# Patient Record
Sex: FEMALE | Race: BLACK OR AFRICAN AMERICAN | URBAN - METROPOLITAN AREA
[De-identification: names, ages, dates, MRNs, and addresses within clinical notes are randomized per-mention and may not be internally consistent; named-entity substitution may affect disease eponyms.]

---

## 2017-12-18 ENCOUNTER — HOSPITAL ENCOUNTER (OUTPATIENT)
Dept: OTHER | Age: 77
Discharge: HOME | End: 2017-12-18
Attending: FAMILY MEDICINE | Admitting: FAMILY MEDICINE

## 2017-12-18 DIAGNOSIS — M25.512 CHRONIC PAIN OF BOTH SHOULDERS: ICD-10-CM

## 2017-12-18 DIAGNOSIS — G89.29 CHRONIC PAIN OF BOTH SHOULDERS: ICD-10-CM

## 2017-12-18 DIAGNOSIS — M25.511 CHRONIC PAIN OF BOTH SHOULDERS: ICD-10-CM

## 2023-10-24 ENCOUNTER — APPOINTMENT (OUTPATIENT)
Dept: CT IMAGING | Age: 83
DRG: 305 | End: 2023-10-24
Attending: STUDENT IN AN ORGANIZED HEALTH CARE EDUCATION/TRAINING PROGRAM
Payer: MEDICARE

## 2023-10-24 ENCOUNTER — HOSPITAL ENCOUNTER (INPATIENT)
Age: 83
LOS: 2 days | Discharge: HOME HEALTH | DRG: 305 | End: 2023-10-26
Attending: STUDENT IN AN ORGANIZED HEALTH CARE EDUCATION/TRAINING PROGRAM | Admitting: INTERNAL MEDICINE
Payer: MEDICARE

## 2023-10-24 ENCOUNTER — APPOINTMENT (OUTPATIENT)
Dept: CT IMAGING | Age: 83
DRG: 305 | End: 2023-10-24
Payer: MEDICARE

## 2023-10-24 ENCOUNTER — APPOINTMENT (OUTPATIENT)
Dept: GENERAL RADIOLOGY | Age: 83
DRG: 305 | End: 2023-10-24
Payer: MEDICARE

## 2023-10-24 DIAGNOSIS — I16.1 HYPERTENSIVE EMERGENCY: ICD-10-CM

## 2023-10-24 DIAGNOSIS — E87.6 HYPOKALEMIA: ICD-10-CM

## 2023-10-24 DIAGNOSIS — Z86.79 PERSONAL HISTORY OF CEREBRAL ARTERY STENOSIS: ICD-10-CM

## 2023-10-24 DIAGNOSIS — I72.0 ANEURYSM OF CAROTID ARTERY (HCC): Primary | ICD-10-CM

## 2023-10-24 DIAGNOSIS — Z86.73 HISTORY OF CVA (CEREBROVASCULAR ACCIDENT): ICD-10-CM

## 2023-10-24 DIAGNOSIS — R94.31 ABNORMAL EKG: ICD-10-CM

## 2023-10-24 DIAGNOSIS — R29.898 WEAKNESS OF BOTH LOWER EXTREMITIES: ICD-10-CM

## 2023-10-24 LAB
ALBUMIN SERPL-MCNC: 4.6 G/DL (ref 3.4–5)
ALBUMIN/GLOB SERPL: 1.2 {RATIO} (ref 1.1–2.2)
ALP SERPL-CCNC: 110 U/L (ref 40–129)
ALT SERPL-CCNC: 42 U/L (ref 10–40)
ANION GAP SERPL CALCULATED.3IONS-SCNC: 19 MMOL/L (ref 3–16)
AST SERPL-CCNC: 39 U/L (ref 15–37)
BACTERIA URNS QL MICRO: ABNORMAL /HPF
BASOPHILS # BLD: 0 K/UL (ref 0–0.2)
BASOPHILS NFR BLD: 0.4 %
BILIRUB SERPL-MCNC: 0.6 MG/DL (ref 0–1)
BILIRUB UR QL STRIP.AUTO: NEGATIVE
BUN SERPL-MCNC: 27 MG/DL (ref 7–20)
CALCIUM SERPL-MCNC: 9.3 MG/DL (ref 8.3–10.6)
CHLORIDE SERPL-SCNC: 99 MMOL/L (ref 99–110)
CK SERPL-CCNC: 505 U/L (ref 26–192)
CLARITY UR: CLEAR
CO2 SERPL-SCNC: 18 MMOL/L (ref 21–32)
COLOR UR: YELLOW
CREAT SERPL-MCNC: 1 MG/DL (ref 0.6–1.2)
DEPRECATED RDW RBC AUTO: 14.5 % (ref 12.4–15.4)
EKG ATRIAL RATE: 95 BPM
EKG DIAGNOSIS: NORMAL
EKG P AXIS: 59 DEGREES
EKG P-R INTERVAL: 162 MS
EKG Q-T INTERVAL: 372 MS
EKG QRS DURATION: 86 MS
EKG QTC CALCULATION (BAZETT): 467 MS
EKG R AXIS: -14 DEGREES
EKG T AXIS: 154 DEGREES
EKG VENTRICULAR RATE: 95 BPM
EOSINOPHIL # BLD: 0 K/UL (ref 0–0.6)
EOSINOPHIL NFR BLD: 0.1 %
EPI CELLS #/AREA URNS AUTO: 0 /HPF (ref 0–5)
GFR SERPLBLD CREATININE-BSD FMLA CKD-EPI: 56 ML/MIN/{1.73_M2}
GLUCOSE SERPL-MCNC: 96 MG/DL (ref 70–99)
GLUCOSE UR STRIP.AUTO-MCNC: NEGATIVE MG/DL
HCT VFR BLD AUTO: 36.6 % (ref 36–48)
HGB BLD-MCNC: 12.1 G/DL (ref 12–16)
HGB UR QL STRIP.AUTO: NEGATIVE
HYALINE CASTS #/AREA URNS AUTO: 0 /LPF (ref 0–8)
INR PPP: 0.99 (ref 0.84–1.16)
KETONES UR STRIP.AUTO-MCNC: NEGATIVE MG/DL
LEUKOCYTE ESTERASE UR QL STRIP.AUTO: ABNORMAL
LYMPHOCYTES # BLD: 1 K/UL (ref 1–5.1)
LYMPHOCYTES NFR BLD: 9.7 %
MCH RBC QN AUTO: 30.2 PG (ref 26–34)
MCHC RBC AUTO-ENTMCNC: 33 G/DL (ref 31–36)
MCV RBC AUTO: 91.8 FL (ref 80–100)
MONOCYTES # BLD: 0.7 K/UL (ref 0–1.3)
MONOCYTES NFR BLD: 6.5 %
NEUTROPHILS # BLD: 8.6 K/UL (ref 1.7–7.7)
NEUTROPHILS NFR BLD: 83.3 %
NITRITE UR QL STRIP.AUTO: POSITIVE
PH UR STRIP.AUTO: 5.5 [PH] (ref 5–8)
PLATELET # BLD AUTO: 270 K/UL (ref 135–450)
PMV BLD AUTO: 7.8 FL (ref 5–10.5)
POTASSIUM SERPL-SCNC: 3.7 MMOL/L (ref 3.5–5.1)
PROT SERPL-MCNC: 8.5 G/DL (ref 6.4–8.2)
PROT UR STRIP.AUTO-MCNC: NEGATIVE MG/DL
PROTHROMBIN TIME: 13.1 SEC (ref 11.5–14.8)
RBC # BLD AUTO: 3.99 M/UL (ref 4–5.2)
RBC CLUMPS #/AREA URNS AUTO: 0 /HPF (ref 0–4)
SODIUM SERPL-SCNC: 136 MMOL/L (ref 136–145)
SP GR UR STRIP.AUTO: 1.01 (ref 1–1.03)
TROPONIN, HIGH SENSITIVITY: 15 NG/L (ref 0–14)
TROPONIN, HIGH SENSITIVITY: 27 NG/L (ref 0–14)
TROPONIN, HIGH SENSITIVITY: 29 NG/L (ref 0–14)
UA DIPSTICK W REFLEX MICRO PNL UR: YES
URN SPEC COLLECT METH UR: ABNORMAL
UROBILINOGEN UR STRIP-ACNC: 0.2 E.U./DL
WBC # BLD AUTO: 10.3 K/UL (ref 4–11)
WBC #/AREA URNS AUTO: 1 /HPF (ref 0–5)

## 2023-10-24 PROCEDURE — 92526 ORAL FUNCTION THERAPY: CPT

## 2023-10-24 PROCEDURE — 84484 ASSAY OF TROPONIN QUANT: CPT

## 2023-10-24 PROCEDURE — 72131 CT LUMBAR SPINE W/O DYE: CPT

## 2023-10-24 PROCEDURE — 2580000003 HC RX 258: Performed by: INTERNAL MEDICINE

## 2023-10-24 PROCEDURE — 6370000000 HC RX 637 (ALT 250 FOR IP): Performed by: INTERNAL MEDICINE

## 2023-10-24 PROCEDURE — 81001 URINALYSIS AUTO W/SCOPE: CPT

## 2023-10-24 PROCEDURE — APPNB30 APP NON BILLABLE TIME 0-30 MINS

## 2023-10-24 PROCEDURE — 6360000002 HC RX W HCPCS: Performed by: INTERNAL MEDICINE

## 2023-10-24 PROCEDURE — 85610 PROTHROMBIN TIME: CPT

## 2023-10-24 PROCEDURE — 1200000000 HC SEMI PRIVATE

## 2023-10-24 PROCEDURE — 36415 COLL VENOUS BLD VENIPUNCTURE: CPT

## 2023-10-24 PROCEDURE — 99285 EMERGENCY DEPT VISIT HI MDM: CPT

## 2023-10-24 PROCEDURE — 70498 CT ANGIOGRAPHY NECK: CPT

## 2023-10-24 PROCEDURE — 4A03X5D MEASUREMENT OF ARTERIAL FLOW, INTRACRANIAL, EXTERNAL APPROACH: ICD-10-PCS | Performed by: INTERNAL MEDICINE

## 2023-10-24 PROCEDURE — 93010 ELECTROCARDIOGRAM REPORT: CPT | Performed by: INTERNAL MEDICINE

## 2023-10-24 PROCEDURE — 2060000000 HC ICU INTERMEDIATE R&B

## 2023-10-24 PROCEDURE — 6370000000 HC RX 637 (ALT 250 FOR IP): Performed by: STUDENT IN AN ORGANIZED HEALTH CARE EDUCATION/TRAINING PROGRAM

## 2023-10-24 PROCEDURE — 92610 EVALUATE SWALLOWING FUNCTION: CPT

## 2023-10-24 PROCEDURE — 85025 COMPLETE CBC W/AUTO DIFF WBC: CPT

## 2023-10-24 PROCEDURE — 80053 COMPREHEN METABOLIC PANEL: CPT

## 2023-10-24 PROCEDURE — APPSS60 APP SPLIT SHARED TIME 46-60 MINUTES

## 2023-10-24 PROCEDURE — 70450 CT HEAD/BRAIN W/O DYE: CPT

## 2023-10-24 PROCEDURE — 93005 ELECTROCARDIOGRAM TRACING: CPT | Performed by: STUDENT IN AN ORGANIZED HEALTH CARE EDUCATION/TRAINING PROGRAM

## 2023-10-24 PROCEDURE — 71045 X-RAY EXAM CHEST 1 VIEW: CPT

## 2023-10-24 PROCEDURE — 82550 ASSAY OF CK (CPK): CPT

## 2023-10-24 PROCEDURE — 6360000004 HC RX CONTRAST MEDICATION: Performed by: STUDENT IN AN ORGANIZED HEALTH CARE EDUCATION/TRAINING PROGRAM

## 2023-10-24 RX ORDER — ONDANSETRON 4 MG/1
4 TABLET, ORALLY DISINTEGRATING ORAL EVERY 8 HOURS PRN
Status: DISCONTINUED | OUTPATIENT
Start: 2023-10-24 | End: 2023-10-26 | Stop reason: HOSPADM

## 2023-10-24 RX ORDER — NIFEDIPINE 60 MG/1
60 TABLET, EXTENDED RELEASE ORAL DAILY
Status: ON HOLD | COMMUNITY
Start: 2021-06-22 | End: 2023-10-26 | Stop reason: SDUPTHER

## 2023-10-24 RX ORDER — SODIUM CHLORIDE 0.9 % (FLUSH) 0.9 %
5-40 SYRINGE (ML) INJECTION PRN
Status: DISCONTINUED | OUTPATIENT
Start: 2023-10-24 | End: 2023-10-26 | Stop reason: HOSPADM

## 2023-10-24 RX ORDER — ROSUVASTATIN CALCIUM 40 MG/1
40 TABLET, COATED ORAL NIGHTLY
Status: DISCONTINUED | OUTPATIENT
Start: 2023-10-24 | End: 2023-10-26 | Stop reason: HOSPADM

## 2023-10-24 RX ORDER — SODIUM CHLORIDE 0.9 % (FLUSH) 0.9 %
5-40 SYRINGE (ML) INJECTION EVERY 12 HOURS SCHEDULED
Status: DISCONTINUED | OUTPATIENT
Start: 2023-10-24 | End: 2023-10-26 | Stop reason: HOSPADM

## 2023-10-24 RX ORDER — ASPIRIN 81 MG/1
81 TABLET, CHEWABLE ORAL DAILY
Status: DISCONTINUED | OUTPATIENT
Start: 2023-10-24 | End: 2023-10-26 | Stop reason: HOSPADM

## 2023-10-24 RX ORDER — SODIUM CHLORIDE 9 MG/ML
INJECTION, SOLUTION INTRAVENOUS PRN
Status: DISCONTINUED | OUTPATIENT
Start: 2023-10-24 | End: 2023-10-26 | Stop reason: HOSPADM

## 2023-10-24 RX ORDER — HYDRALAZINE HYDROCHLORIDE 25 MG/1
25 TABLET, FILM COATED ORAL ONCE
Status: COMPLETED | OUTPATIENT
Start: 2023-10-24 | End: 2023-10-24

## 2023-10-24 RX ORDER — ASPIRIN 81 MG/1
81 TABLET, CHEWABLE ORAL DAILY
COMMUNITY
Start: 2021-01-20

## 2023-10-24 RX ORDER — LOSARTAN POTASSIUM 50 MG/1
50 TABLET ORAL DAILY
COMMUNITY
Start: 2022-08-04

## 2023-10-24 RX ORDER — LOSARTAN POTASSIUM 25 MG/1
50 TABLET ORAL DAILY
Status: DISCONTINUED | OUTPATIENT
Start: 2023-10-25 | End: 2023-10-26 | Stop reason: HOSPADM

## 2023-10-24 RX ORDER — ASCORBIC ACID 500 MG
500 TABLET ORAL 2 TIMES DAILY
COMMUNITY

## 2023-10-24 RX ORDER — POLYETHYLENE GLYCOL 3350 17 G/17G
17 POWDER, FOR SOLUTION ORAL DAILY PRN
Status: DISCONTINUED | OUTPATIENT
Start: 2023-10-24 | End: 2023-10-26 | Stop reason: HOSPADM

## 2023-10-24 RX ORDER — ASPIRIN 300 MG/1
300 SUPPOSITORY RECTAL DAILY
Status: DISCONTINUED | OUTPATIENT
Start: 2023-10-24 | End: 2023-10-26 | Stop reason: HOSPADM

## 2023-10-24 RX ORDER — ONDANSETRON 2 MG/ML
4 INJECTION INTRAMUSCULAR; INTRAVENOUS EVERY 6 HOURS PRN
Status: DISCONTINUED | OUTPATIENT
Start: 2023-10-24 | End: 2023-10-26 | Stop reason: HOSPADM

## 2023-10-24 RX ORDER — NIFEDIPINE 30 MG/1
60 TABLET, EXTENDED RELEASE ORAL 2 TIMES DAILY
Status: DISCONTINUED | OUTPATIENT
Start: 2023-10-24 | End: 2023-10-26 | Stop reason: HOSPADM

## 2023-10-24 RX ORDER — ATORVASTATIN CALCIUM 40 MG/1
40 TABLET, FILM COATED ORAL DAILY
COMMUNITY
Start: 2023-09-11

## 2023-10-24 RX ORDER — ENOXAPARIN SODIUM 100 MG/ML
40 INJECTION SUBCUTANEOUS DAILY
Status: DISCONTINUED | OUTPATIENT
Start: 2023-10-25 | End: 2023-10-26 | Stop reason: HOSPADM

## 2023-10-24 RX ORDER — HYDRALAZINE HYDROCHLORIDE 20 MG/ML
10 INJECTION INTRAMUSCULAR; INTRAVENOUS EVERY 6 HOURS PRN
Status: DISCONTINUED | OUTPATIENT
Start: 2023-10-24 | End: 2023-10-25

## 2023-10-24 RX ORDER — NIFEDIPINE 30 MG/1
60 TABLET, EXTENDED RELEASE ORAL DAILY
Status: DISCONTINUED | OUTPATIENT
Start: 2023-10-24 | End: 2023-10-24

## 2023-10-24 RX ADMIN — ROSUVASTATIN CALCIUM 40 MG: 40 TABLET, COATED ORAL at 21:25

## 2023-10-24 RX ADMIN — ASPIRIN 81 MG: 81 TABLET, CHEWABLE ORAL at 16:34

## 2023-10-24 RX ADMIN — NIFEDIPINE 60 MG: 30 TABLET, EXTENDED RELEASE ORAL at 21:25

## 2023-10-24 RX ADMIN — METOPROLOL TARTRATE 25 MG: 25 TABLET, FILM COATED ORAL at 13:34

## 2023-10-24 RX ADMIN — HYDRALAZINE HYDROCHLORIDE 25 MG: 25 TABLET, FILM COATED ORAL at 09:03

## 2023-10-24 RX ADMIN — CEFTRIAXONE SODIUM 1000 MG: 1 INJECTION, POWDER, FOR SOLUTION INTRAMUSCULAR; INTRAVENOUS at 16:35

## 2023-10-24 RX ADMIN — IOPAMIDOL 75 ML: 755 INJECTION, SOLUTION INTRAVENOUS at 07:21

## 2023-10-24 RX ADMIN — METOPROLOL TARTRATE 25 MG: 25 TABLET, FILM COATED ORAL at 23:59

## 2023-10-24 RX ADMIN — SODIUM CHLORIDE, PRESERVATIVE FREE 10 ML: 5 INJECTION INTRAVENOUS at 21:14

## 2023-10-24 RX ADMIN — HYDRALAZINE HYDROCHLORIDE 10 MG: 20 INJECTION, SOLUTION INTRAMUSCULAR; INTRAVENOUS at 20:02

## 2023-10-24 RX ADMIN — NIFEDIPINE 60 MG: 30 TABLET, EXTENDED RELEASE ORAL at 13:35

## 2023-10-24 ASSESSMENT — LIFESTYLE VARIABLES
HOW MANY STANDARD DRINKS CONTAINING ALCOHOL DO YOU HAVE ON A TYPICAL DAY: 1 OR 2
HOW OFTEN DO YOU HAVE A DRINK CONTAINING ALCOHOL: MONTHLY OR LESS

## 2023-10-24 ASSESSMENT — PAIN SCALES - GENERAL
PAINLEVEL_OUTOF10: 0

## 2023-10-24 NOTE — PLAN OF CARE
Problem: Discharge Planning  Goal: Discharge to home or other facility with appropriate resources  Outcome: Progressing     Problem: Safety - Adult  Goal: Free from fall injury  Outcome: Progressing  Note: Bed alarm engaged, patient educated on how to turn call light on if she needs assistance, patient oriented to room     Problem: ABCDS Injury Assessment  Goal: Absence of physical injury  Outcome: Progressing

## 2023-10-24 NOTE — CONSULTS
In patient Neurology consult        [facility] Neurology      Eliud Lees MD      Alab Holcombhy  1940    Date of Service: 10/24/2023    Referring Physician: Francesco Branch MD      Reason for the consult and CC:  bilateral lower extremity weakness. HPI:   The patient is a 81y.o.  years old female with past medical history of hypertension, hyperlipidemia and neuropathy hospital with worsening weakness. Patient reports yesterday evening she slid out of her bed and was unable to get up. Degree severe duration persistent. Patient slept on the floor and was in the morning she was not get up. Patient called EMS for lift assist.  During this time, patient denies headache, speech or vision disturbance, upper extremity weakness. Patient was brought to the emergency room CT head showed no acute injury abnormality. CTA head neck showed multiple vessel intracranial stenosis, no LVO and 1-2 mm outpouching in the left supraclinoid ICA,  infundibulum versus aneurysm. Blood pressure was elevated in the emergency room, systolic 588C. Lab work-up showed elevated CK, 500. Patient seen in the emergency room with family at bedside. She reports same lower extremity weakness, left greater than right. Patient does report having missed a few doses of her blood pressure medication this past week because she had experienced dizziness. Today she denies headache, dizziness, dysarthria or dysphagia. Other review of systems unremarkable       Constitutional:   Vitals:    10/24/23 1011 10/24/23 1115 10/24/23 1241 10/24/23 1334   BP:  (!) 173/111 (!) 186/62 (!) 191/63   Pulse: 90 91 87 84   Resp: 20 25 21    Temp:       TempSrc:       SpO2: 96% 96% 97%          I personally reviewed and updated social history, past medical history, medications, allergy, surgical history, and family history as documented in the patient's electronic health records.        ROS: 10-14 ROS reviewed with the patient/nurse/family which were

## 2023-10-24 NOTE — ED PROVIDER NOTES
179/59   Pulse: 95   Resp: 17   Temp:    SpO2: 97%         Medications   hydrALAZINE (APRESOLINE) tablet 25 mg (has no administration in time range)   iopamidol (ISOVUE-370) 76 % injection 75 mL (75 mLs IntraVENous Given 10/24/23 0721)       Is this patient to be included in the SEP-1 Core Measure due to severe sepsis or septic shock? No   Exclusion criteria - the patient is NOT to be included for SEP-1 Core Measure due to: Infection is not suspected        Course and MDM:  81-year-old female presenting with bilateral leg weakness, last known well 10 PM yesterday. Symptoms did start at 10 PM yesterday and continued this morning when she awoke. She arrives significantly hypertensive. NIH is 2 for symmetric leg weakness. I did consult with  stroke team, we agree patient is not a TNK candidate due to extended time since last known well. We will plan for CTA to assess for LVO. Hypertensive emergency is also in the differential.  She has no chest, back or abdominal pain and I have low suspicion for acute dissection. CTA of the head and neck is showing multiple areas of moderate cerebral arterial stenosis but no LVO. Also noted is small ICA outpouching, infundibulum versus aneurysm. I have discussed findings with patient and I do believe she warrants admission for treatment of hypertensive emergency and further stroke work-up. She continues to deny any pain to her chest, abdomen or back upon reevaluation as of 8:45 AM.  She EKG is showing some lateral ST depressions however her initial high-sensitivity troponin is nearly normal.  Low suspicion for ACS. We will plan for oral hydralazine with goal to lower systolic blood pressure to around 160 while in the emergency room. Hospitalist paged for admission.     Disposition Considerations (Tests not ordered but considered, Shared Decision Making, Pt Expectation of Test or Tx.):  MRI not deemed clinically necessary in the ED setting  Social Determinants :

## 2023-10-24 NOTE — ED NOTES
ED TO INPATIENT SBAR HANDOFF    Patient Name: Erik Badillo   :  1940  81 y.o. MRN:  [id number]  Preferred Name  [address]  ED Room #:  ED-0008/08  Family/Caregiver Present no   Restraints no   Sitter no   Sepsis Risk Score Sepsis Risk Score: 3.6    Situation  Code Status: Full code. Allergies: Oxybutynin  Weight: No data found. Arrived from: home  Chief Complaint:   Chief Complaint   Patient presents with    Fatigue     Pt reports that she slid out of the bed last evening and decided to sleep on the floor. Attempted to get up to go to the bathroom this morning and was unable to d/t feeling weak so she called EMS for lift assist. Denies any injury just reports feeling more weak than her baseline. Hospital Problem/Diagnosis:  Principal Problem:    Bilateral leg weakness  Resolved Problems:    * No resolved hospital problems. *    Imaging:   XR CHEST PORTABLE   Final Result   Mild perihilar airspace opacities suggesting underlying vascular congestion   or mild edema. CTA HEAD NECK W CONTRAST   Final Result   1. There is diffuse atherosclerosis involving the intracranial vasculature. 2. Moderate stenosis involving the V4 segments of the vertebral arteries   bilaterally. 3. There is moderate stenosis involving the P1 segment of the posterior   cerebral arteries bilaterally. 4. Mild-to-moderate stenosis involving the basilar artery. 5. Atherosclerosis contributes to mild stenosis involving the supraclinoid   ICAs. 6. A 1-2 mm outpouching is seen arising from the left supraclinoid ICA, which   may represent an infundibulum versus a tiny aneurysm. 7. Atherosclerosis contributes to stenosis at the origin of both vertebral   arteries, severe on the right and moderate to severe on the left. 8. No significant stenosis seen of the cervical ICAs by NASCET criteria. CT HEAD WO CONTRAST   Final Result   No acute intracranial abnormality.       Findings were discussed with Jer Uribe at

## 2023-10-24 NOTE — H&P
V2.0  History and Physical      Name:  Fang Mariscal /Age/Sex: 1940  (81 y.o. female)   MRN & CSN:  [id number] & [id number] Encounter Date/Time: 10/24/2023 5:44 PM EDT   Location:  [facility]338/3381- PCP: Bren Gorman MD       Hospital Day: 1    Assessment and Plan:   Fang Mariscal is a 81 y.o. female with a pmh of  hypertension, prior CVA in  with no residual deficits, HLD who presented with complaint of bilateral leg weakness with difficulty ambulating. Hospital Problems             Last Modified POA    * (Principal) Bilateral leg weakness 10/24/2023 Yes       Plan:    Bilateral lower extremity weakness: r/o CVA/TIA or neuropathy  CT head showed no acute intracranial abnormality. CTA head neck showed multiple vessel intracranial stenosis, no LVO and A1 check] neurology consult appreciated: MRI brain, CT lumbar spine and echo pending. B12 and folate  Neurochecks, telemetry monitoring. Aspirin and statin  A1c, lipid panel, TFT  SLP, PT OT evaluation    Hypertensive urgency: Resumed home medications. Suspected aneurysm:  1-2 mm outpouching in the left supraclinoid ICA,  infundibulum versus aneurysm. Outpatient follow-up at the [address] [place] [address]. Bacteriuria: Continue Rocephin pending cultures. Disposition:   Current Living situation: Home  Expected Disposition: PT OT evaluation  Estimated D/C: 10/25    Diet ADULT DIET; Regular   DVT Prophylaxis [] Lovenox, []  Heparin, [] SCDs, [] Ambulation,  [] Eliquis, [] Xarelto, [] Coumadin   Code Status Full Code   Surrogate Decision Maker/ POA Veronica Gresham (Child)   [phone]     Personally reviewed Lab Studies and Imaging     EKG interpreted personally and results: EKG with sinus rhythm and PVCs, LVH and nonspecific ST-T wave changes. Imaging that was interpreted personally includes and results: CXR mild pulmonary vascular congestion.       History from:     patient, son    History of Present Illness:     Chief Complaint: Bilateral leg

## 2023-10-24 NOTE — PLAN OF CARE
STROKE TEAM PLAN OF CARE    Name:  Alba Parr  : 1940  MRN:  [id number]  Today's Date: 10/24/2023    Stroke team contacted at: 07:10  History obtained from: emergency physician    History     Alba Parr is a 81 y.o. female who presented with BLE weakness. Briefly, pt has a history of prior stroke and hypertensive urgency. At around 10pm last night, she got out of the bed and found that her BLE were weak. She couldn't get off the ground and spent the evening on the ground. In the ED this AM, drift in BLE without weakness in the BUE nor speech changes. Imaging     CTA HEAD NECK W CONTRAST   Final Result   1. There is diffuse atherosclerosis involving the intracranial vasculature. 2. Moderate stenosis involving the V4 segments of the vertebral arteries   bilaterally. 3. There is moderate stenosis involving the P1 segment of the posterior   cerebral arteries bilaterally. 4. Mild-to-moderate stenosis involving the basilar artery. 5. Atherosclerosis contributes to mild stenosis involving the supraclinoid   ICAs. 6. A 1-2 mm outpouching is seen arising from the left supraclinoid ICA, which   may represent an infundibulum versus a tiny aneurysm. 7. Atherosclerosis contributes to stenosis at the origin of both vertebral   arteries, severe on the right and moderate to severe on the left. 8. No significant stenosis seen of the cervical ICAs by NASCET criteria. CT HEAD WO CONTRAST   Final Result   No acute intracranial abnormality. Findings were discussed with Yordy Shah at 7:29 am on 10/24/2023. Acute Ischemic Stroke Clinical Decision Making     (1) Intravenous thrombolysis:  The patient is not a candidate for IV thrombolysis based on:  Time greater than 4.5h from symptom onset, ddx broadens beyond acute cerebrovascular occlusive event.     (2) Angiography / Thrombectomy:  The patient does not have evidence of a proximal large vessel occlusion on CTA, and therefore

## 2023-10-24 NOTE — ED TRIAGE NOTES
Pt reports that she slid out of the bed last evening and decided to sleep on the floor. Attempted to get up to go to the bathroom this morning and was unable to d/t feeling weak so she called EMS for lift assist. Denies any injury just reports feeling more weak than her baseline.

## 2023-10-25 ENCOUNTER — APPOINTMENT (OUTPATIENT)
Dept: MRI IMAGING | Age: 83
DRG: 305 | End: 2023-10-25
Payer: MEDICARE

## 2023-10-25 PROBLEM — I16.1 HYPERTENSIVE EMERGENCY: Status: ACTIVE | Noted: 2023-10-25

## 2023-10-25 PROBLEM — Z86.73 HISTORY OF CVA (CEREBROVASCULAR ACCIDENT): Status: ACTIVE | Noted: 2023-10-25

## 2023-10-25 PROBLEM — M51.26 LUMBAR DISC DISPLACEMENT WITHOUT MYELOPATHY: Status: ACTIVE | Noted: 2023-10-25

## 2023-10-25 LAB
ALBUMIN SERPL-MCNC: 3.9 G/DL (ref 3.4–5)
ALBUMIN/GLOB SERPL: 1.1 {RATIO} (ref 1.1–2.2)
ALP SERPL-CCNC: 88 U/L (ref 40–129)
ALT SERPL-CCNC: 28 U/L (ref 10–40)
ANION GAP SERPL CALCULATED.3IONS-SCNC: 15 MMOL/L (ref 3–16)
AST SERPL-CCNC: 29 U/L (ref 15–37)
BASOPHILS # BLD: 0 K/UL (ref 0–0.2)
BASOPHILS NFR BLD: 0.6 %
BILIRUB SERPL-MCNC: 0.9 MG/DL (ref 0–1)
BUN SERPL-MCNC: 26 MG/DL (ref 7–20)
CALCIUM SERPL-MCNC: 9.1 MG/DL (ref 8.3–10.6)
CHLORIDE SERPL-SCNC: 100 MMOL/L (ref 99–110)
CHOLEST SERPL-MCNC: 179 MG/DL (ref 0–199)
CO2 SERPL-SCNC: 21 MMOL/L (ref 21–32)
CREAT SERPL-MCNC: 1.3 MG/DL (ref 0.6–1.2)
DEPRECATED RDW RBC AUTO: 14.8 % (ref 12.4–15.4)
EOSINOPHIL # BLD: 0.1 K/UL (ref 0–0.6)
EOSINOPHIL NFR BLD: 1.3 %
EST. AVERAGE GLUCOSE BLD GHB EST-MCNC: 108.3 MG/DL
FOLATE SERPL-MCNC: 16.3 NG/ML (ref 4.78–24.2)
GFR SERPLBLD CREATININE-BSD FMLA CKD-EPI: 41 ML/MIN/{1.73_M2}
GLUCOSE SERPL-MCNC: 117 MG/DL (ref 70–99)
HBA1C MFR BLD: 5.4 %
HCT VFR BLD AUTO: 35 % (ref 36–48)
HDLC SERPL-MCNC: 66 MG/DL (ref 40–60)
HGB BLD-MCNC: 11.5 G/DL (ref 12–16)
LDLC SERPL CALC-MCNC: 103 MG/DL
LYMPHOCYTES # BLD: 2 K/UL (ref 1–5.1)
LYMPHOCYTES NFR BLD: 30.6 %
MAGNESIUM SERPL-MCNC: 2.1 MG/DL (ref 1.8–2.4)
MCH RBC QN AUTO: 30.1 PG (ref 26–34)
MCHC RBC AUTO-ENTMCNC: 32.9 G/DL (ref 31–36)
MCV RBC AUTO: 91.6 FL (ref 80–100)
MONOCYTES # BLD: 0.5 K/UL (ref 0–1.3)
MONOCYTES NFR BLD: 7.8 %
NEUTROPHILS # BLD: 3.9 K/UL (ref 1.7–7.7)
NEUTROPHILS NFR BLD: 59.7 %
PHOSPHATE SERPL-MCNC: 4 MG/DL (ref 2.5–4.9)
PLATELET # BLD AUTO: 253 K/UL (ref 135–450)
PMV BLD AUTO: 7.8 FL (ref 5–10.5)
POTASSIUM SERPL-SCNC: 3.5 MMOL/L (ref 3.5–5.1)
PROT SERPL-MCNC: 7.3 G/DL (ref 6.4–8.2)
RBC # BLD AUTO: 3.82 M/UL (ref 4–5.2)
SODIUM SERPL-SCNC: 136 MMOL/L (ref 136–145)
TRIGL SERPL-MCNC: 52 MG/DL (ref 0–150)
TSH SERPL DL<=0.005 MIU/L-ACNC: 2.85 UIU/ML (ref 0.27–4.2)
VIT B12 SERPL-MCNC: 532 PG/ML (ref 211–911)
VLDLC SERPL CALC-MCNC: 10 MG/DL
WBC # BLD AUTO: 6.6 K/UL (ref 4–11)

## 2023-10-25 PROCEDURE — 83036 HEMOGLOBIN GLYCOSYLATED A1C: CPT

## 2023-10-25 PROCEDURE — 85025 COMPLETE CBC W/AUTO DIFF WBC: CPT

## 2023-10-25 PROCEDURE — 36415 COLL VENOUS BLD VENIPUNCTURE: CPT

## 2023-10-25 PROCEDURE — 92526 ORAL FUNCTION THERAPY: CPT

## 2023-10-25 PROCEDURE — 97530 THERAPEUTIC ACTIVITIES: CPT

## 2023-10-25 PROCEDURE — 82607 VITAMIN B-12: CPT

## 2023-10-25 PROCEDURE — 72146 MRI CHEST SPINE W/O DYE: CPT

## 2023-10-25 PROCEDURE — APPNB30 APP NON BILLABLE TIME 0-30 MINS

## 2023-10-25 PROCEDURE — 82746 ASSAY OF FOLIC ACID SERUM: CPT

## 2023-10-25 PROCEDURE — 6360000002 HC RX W HCPCS: Performed by: INTERNAL MEDICINE

## 2023-10-25 PROCEDURE — 84100 ASSAY OF PHOSPHORUS: CPT

## 2023-10-25 PROCEDURE — 6370000000 HC RX 637 (ALT 250 FOR IP): Performed by: INTERNAL MEDICINE

## 2023-10-25 PROCEDURE — 83735 ASSAY OF MAGNESIUM: CPT

## 2023-10-25 PROCEDURE — 97535 SELF CARE MNGMENT TRAINING: CPT

## 2023-10-25 PROCEDURE — 97161 PT EVAL LOW COMPLEX 20 MIN: CPT

## 2023-10-25 PROCEDURE — 97116 GAIT TRAINING THERAPY: CPT

## 2023-10-25 PROCEDURE — 84443 ASSAY THYROID STIM HORMONE: CPT

## 2023-10-25 PROCEDURE — 70551 MRI BRAIN STEM W/O DYE: CPT

## 2023-10-25 PROCEDURE — 80061 LIPID PANEL: CPT

## 2023-10-25 PROCEDURE — 80053 COMPREHEN METABOLIC PANEL: CPT

## 2023-10-25 PROCEDURE — 97165 OT EVAL LOW COMPLEX 30 MIN: CPT

## 2023-10-25 PROCEDURE — 72141 MRI NECK SPINE W/O DYE: CPT

## 2023-10-25 PROCEDURE — 1200000000 HC SEMI PRIVATE

## 2023-10-25 PROCEDURE — 99223 1ST HOSP IP/OBS HIGH 75: CPT | Performed by: PSYCHIATRY & NEUROLOGY

## 2023-10-25 PROCEDURE — 2580000003 HC RX 258: Performed by: INTERNAL MEDICINE

## 2023-10-25 PROCEDURE — 72148 MRI LUMBAR SPINE W/O DYE: CPT

## 2023-10-25 PROCEDURE — APPSS45 APP SPLIT SHARED TIME 31-45 MINUTES

## 2023-10-25 RX ORDER — SODIUM CHLORIDE 9 MG/ML
INJECTION, SOLUTION INTRAVENOUS CONTINUOUS
Status: DISCONTINUED | OUTPATIENT
Start: 2023-10-25 | End: 2023-10-26

## 2023-10-25 RX ORDER — ACETAMINOPHEN 325 MG/1
650 TABLET ORAL EVERY 4 HOURS PRN
Status: DISCONTINUED | OUTPATIENT
Start: 2023-10-25 | End: 2023-10-26 | Stop reason: HOSPADM

## 2023-10-25 RX ADMIN — SODIUM CHLORIDE, PRESERVATIVE FREE 10 ML: 5 INJECTION INTRAVENOUS at 09:11

## 2023-10-25 RX ADMIN — ROSUVASTATIN CALCIUM 40 MG: 40 TABLET, COATED ORAL at 20:31

## 2023-10-25 RX ADMIN — SODIUM CHLORIDE: 9 INJECTION, SOLUTION INTRAVENOUS at 17:19

## 2023-10-25 RX ADMIN — ASPIRIN 81 MG: 81 TABLET, CHEWABLE ORAL at 09:12

## 2023-10-25 RX ADMIN — METOPROLOL TARTRATE 25 MG: 25 TABLET, FILM COATED ORAL at 20:31

## 2023-10-25 RX ADMIN — SODIUM CHLORIDE, PRESERVATIVE FREE 10 ML: 5 INJECTION INTRAVENOUS at 04:44

## 2023-10-25 RX ADMIN — CEFTRIAXONE SODIUM 1000 MG: 1 INJECTION, POWDER, FOR SOLUTION INTRAMUSCULAR; INTRAVENOUS at 17:19

## 2023-10-25 RX ADMIN — NIFEDIPINE 60 MG: 30 TABLET, EXTENDED RELEASE ORAL at 09:12

## 2023-10-25 RX ADMIN — HYDRALAZINE HYDROCHLORIDE 10 MG: 20 INJECTION, SOLUTION INTRAMUSCULAR; INTRAVENOUS at 04:43

## 2023-10-25 RX ADMIN — LOSARTAN POTASSIUM 50 MG: 25 TABLET, FILM COATED ORAL at 09:12

## 2023-10-25 RX ADMIN — ENOXAPARIN SODIUM 40 MG: 100 INJECTION SUBCUTANEOUS at 09:11

## 2023-10-25 RX ADMIN — SODIUM CHLORIDE, PRESERVATIVE FREE 10 ML: 5 INJECTION INTRAVENOUS at 20:31

## 2023-10-25 RX ADMIN — SODIUM CHLORIDE: 9 INJECTION, SOLUTION INTRAVENOUS at 20:36

## 2023-10-25 RX ADMIN — NIFEDIPINE 60 MG: 30 TABLET, EXTENDED RELEASE ORAL at 20:31

## 2023-10-25 RX ADMIN — METOPROLOL TARTRATE 25 MG: 25 TABLET, FILM COATED ORAL at 09:12

## 2023-10-25 ASSESSMENT — PAIN DESCRIPTION - ORIENTATION: ORIENTATION: INNER;LOWER

## 2023-10-25 ASSESSMENT — PAIN SCALES - GENERAL
PAINLEVEL_OUTOF10: 7
PAINLEVEL_OUTOF10: 0
PAINLEVEL_OUTOF10: 0

## 2023-10-25 ASSESSMENT — PAIN DESCRIPTION - LOCATION: LOCATION: BACK

## 2023-10-25 ASSESSMENT — PAIN DESCRIPTION - DESCRIPTORS: DESCRIPTORS: ACHING

## 2023-10-25 NOTE — PLAN OF CARE
Problem: Discharge Planning  Goal: Discharge to home or other facility with appropriate resources  Outcome: Progressing  Note: Patient planning to see PT/OT to figure out plan     Problem: Safety - Adult  Goal: Free from fall injury  Outcome: Progressing     Problem: ABCDS Injury Assessment  Goal: Absence of physical injury  Outcome: Progressing     Problem: Pain  Goal: Verbalizes/displays adequate comfort level or baseline comfort level  Outcome: Progressing     Problem: Hematologic - Adult  Goal: Maintains hematologic stability  Outcome: Progressing

## 2023-10-25 NOTE — FLOWSHEET NOTE
MRI called for pt. Pt has not been evaluated per PT/OT as of yet; placed LIFT sheet under pt and purewick removed. Ticket to ride printed. Transport here. Telemetry removed and left in pt's room.  Pt taken in bed via transporter to MRI

## 2023-10-25 NOTE — FLOWSHEET NOTE
Pt's son Thalia Rodríguez called at shift change and requested to speak with RN. [facility] obtained phone number [phone] for call back. Christiano Tijerina not listed in chart as emergency contact. RN to bedside and d/w pt. Pt requested to speak with son and reports that her phone is not here with her. Hospital phone in pt's room is activated and reviewed with pt. RN assisted pt by calling  to call her son at number above. Pt spoke with son and updated son. Pt agreed to add son onto chart.  RN added christiano Tijerina onto pt's contact list now

## 2023-10-26 VITALS
TEMPERATURE: 97.7 F | RESPIRATION RATE: 16 BRPM | WEIGHT: 189.6 LBS | SYSTOLIC BLOOD PRESSURE: 176 MMHG | BODY MASS INDEX: 34.89 KG/M2 | OXYGEN SATURATION: 96 % | HEIGHT: 62 IN | DIASTOLIC BLOOD PRESSURE: 70 MMHG | HEART RATE: 63 BPM

## 2023-10-26 LAB
ALBUMIN SERPL-MCNC: 3.7 G/DL (ref 3.4–5)
ANION GAP SERPL CALCULATED.3IONS-SCNC: 17 MMOL/L (ref 3–16)
BUN SERPL-MCNC: 30 MG/DL (ref 7–20)
CALCIUM SERPL-MCNC: 8.8 MG/DL (ref 8.3–10.6)
CHLORIDE SERPL-SCNC: 103 MMOL/L (ref 99–110)
CO2 SERPL-SCNC: 18 MMOL/L (ref 21–32)
CREAT SERPL-MCNC: 1.1 MG/DL (ref 0.6–1.2)
FERRITIN SERPL IA-MCNC: 224.5 NG/ML (ref 15–150)
GFR SERPLBLD CREATININE-BSD FMLA CKD-EPI: 50 ML/MIN/{1.73_M2}
GLUCOSE SERPL-MCNC: 108 MG/DL (ref 70–99)
IRON SATN MFR SERPL: 33 % (ref 15–50)
IRON SERPL-MCNC: 78 UG/DL (ref 37–145)
PHOSPHATE SERPL-MCNC: 4 MG/DL (ref 2.5–4.9)
POTASSIUM SERPL-SCNC: 3.4 MMOL/L (ref 3.5–5.1)
SODIUM SERPL-SCNC: 138 MMOL/L (ref 136–145)
TIBC SERPL-MCNC: 235 UG/DL (ref 260–445)

## 2023-10-26 PROCEDURE — 83550 IRON BINDING TEST: CPT

## 2023-10-26 PROCEDURE — APPSS45 APP SPLIT SHARED TIME 31-45 MINUTES

## 2023-10-26 PROCEDURE — 6370000000 HC RX 637 (ALT 250 FOR IP): Performed by: INTERNAL MEDICINE

## 2023-10-26 PROCEDURE — 6360000002 HC RX W HCPCS: Performed by: INTERNAL MEDICINE

## 2023-10-26 PROCEDURE — 2580000003 HC RX 258: Performed by: INTERNAL MEDICINE

## 2023-10-26 PROCEDURE — 97110 THERAPEUTIC EXERCISES: CPT

## 2023-10-26 PROCEDURE — 97116 GAIT TRAINING THERAPY: CPT

## 2023-10-26 PROCEDURE — 99232 SBSQ HOSP IP/OBS MODERATE 35: CPT | Performed by: PSYCHIATRY & NEUROLOGY

## 2023-10-26 PROCEDURE — 80069 RENAL FUNCTION PANEL: CPT

## 2023-10-26 PROCEDURE — APPNB30 APP NON BILLABLE TIME 0-30 MINS

## 2023-10-26 PROCEDURE — 6360000002 HC RX W HCPCS: Performed by: NURSE PRACTITIONER

## 2023-10-26 PROCEDURE — 36415 COLL VENOUS BLD VENIPUNCTURE: CPT

## 2023-10-26 PROCEDURE — 83540 ASSAY OF IRON: CPT

## 2023-10-26 PROCEDURE — 82728 ASSAY OF FERRITIN: CPT

## 2023-10-26 RX ORDER — LABETALOL HYDROCHLORIDE 5 MG/ML
10 INJECTION, SOLUTION INTRAVENOUS EVERY 6 HOURS PRN
Status: DISCONTINUED | OUTPATIENT
Start: 2023-10-26 | End: 2023-10-26 | Stop reason: HOSPADM

## 2023-10-26 RX ORDER — POTASSIUM CHLORIDE 20 MEQ/1
40 TABLET, EXTENDED RELEASE ORAL ONCE
Status: COMPLETED | OUTPATIENT
Start: 2023-10-26 | End: 2023-10-26

## 2023-10-26 RX ORDER — NIFEDIPINE 60 MG/1
60 TABLET, EXTENDED RELEASE ORAL 2 TIMES DAILY
Qty: 30 TABLET | Refills: 3 | Status: SHIPPED | OUTPATIENT
Start: 2023-10-26

## 2023-10-26 RX ADMIN — ENOXAPARIN SODIUM 40 MG: 100 INJECTION SUBCUTANEOUS at 10:05

## 2023-10-26 RX ADMIN — POTASSIUM CHLORIDE 40 MEQ: 1500 TABLET, EXTENDED RELEASE ORAL at 10:05

## 2023-10-26 RX ADMIN — LABETALOL HYDROCHLORIDE 10 MG: 5 INJECTION INTRAVENOUS at 01:06

## 2023-10-26 RX ADMIN — LOSARTAN POTASSIUM 50 MG: 25 TABLET, FILM COATED ORAL at 10:05

## 2023-10-26 RX ADMIN — SODIUM CHLORIDE, PRESERVATIVE FREE 10 ML: 5 INJECTION INTRAVENOUS at 01:00

## 2023-10-26 RX ADMIN — SODIUM CHLORIDE, PRESERVATIVE FREE 10 ML: 5 INJECTION INTRAVENOUS at 10:05

## 2023-10-26 RX ADMIN — METOPROLOL TARTRATE 25 MG: 25 TABLET, FILM COATED ORAL at 10:05

## 2023-10-26 RX ADMIN — NIFEDIPINE 60 MG: 30 TABLET, EXTENDED RELEASE ORAL at 10:05

## 2023-10-26 RX ADMIN — SODIUM CHLORIDE: 9 INJECTION, SOLUTION INTRAVENOUS at 01:09

## 2023-10-26 RX ADMIN — ASPIRIN 81 MG: 81 TABLET, CHEWABLE ORAL at 10:05

## 2023-10-26 ASSESSMENT — PAIN DESCRIPTION - LOCATION: LOCATION: BACK

## 2023-10-26 ASSESSMENT — PAIN SCALES - GENERAL
PAINLEVEL_OUTOF10: 7
PAINLEVEL_OUTOF10: 0

## 2023-10-26 NOTE — DISCHARGE SUMMARY
V2.0  Discharge Summary    Name:  Ivy Parks /Age/Sex: 1940 (81 y.o. female)   Admit Date: 10/24/2023  Discharge Date: 10/26/23    MRN & CSN:  [id number] & [id number] Encounter Date and Time 10/26/23 5:36 PM EDT    Attending:  No att. providers found Discharging Provider: John Richardson MD       Hospital Course:     Brief HPI: Ivy Parks is a 81 y.o. female with a pmh of  hypertension, prior CVA in  with no residual deficits, HLD who presented with complaint of bilateral leg weakness with difficulty ambulating. Bilateral lower extremity weakness: CVA ruled out. History of prior CVA:  CT head showed no acute intracranial abnormality. CTA head neck showed multiple vessel intracranial stenosis, no LVO. MRI brain without acute infarct. CT lumbar spine with multilevel degenerative changes and neural foraminal narrowing. 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 Outpatient follow-up if worsens for further work-up and EMG/NCV.  TSH, B12 and folate wnl. Continued aspirin and statin  A1c 5.4% 10/25/2023, lipid panel reviewed  PT OT recommended home with home care. Follow-up with orthopedic surgery outpatient. Left renal lesion: Further imaging with renal protocol recommended outpatient. Advised to follow-up with PCP ASAP. Hypokalemia: Replaced. Elevated serum creatinine:  Baseline Scr ~1.0.  Peak Scr 1.3. Improved with IV fluids. Resumed home medications on discharge. Hypertensive urgency:  Nifedipine dose increased on discharge. Resumed metoprolol and losartan. Reiterated the need for close monitoring and follow-up with PCP. Suspected aneurysm:  1-2 mm outpouching in the left supraclinoid ICA, infundibulum versus aneurysm. Outpatient follow-up at the [address] [place] [address]. Bacteriuria: Completed course of Rocephin. Urine cultures not sent.       Mild anemia:

## 2023-10-26 NOTE — DISCHARGE INSTRUCTIONS
Your information:  Name: Elisabet Lopez  : 1940    Your Discharge Instructions    What to do after you leave the hospital:    Read, review and familiarize yourself with the information provided below and in a separate packet on   Hypertension    Close monitoring of blood pressure and follow up with BID for medication adjustment. Repeat BMP with PCP in 1 week. Follow up with PCP for imaging of left kidney lesion noted incidentally on MRI T spine. Follow up at the [address] [place] [address] for suspected brain aneurysm. Diet: Regular; Low Fat; Low Cholesterol; Low Sodium    Recommended activity:   As tolerated  Avoid strenuous activity until instructed by your physician to resume; balance rest with periods of light to normal activity. If you experience any of the following: Unusual or inadequately controlled pain; unusual transient shortness of breath; recurrent or persistent nausea, heartburn, palpitations or lightheadedness; increased swelling; increased fatigue; fever >100; please follow up with your PCP or go to the Emergency Room. Home Health/ Outpatient Services: [address]      Information obtained by:  By signing below, I understand and acknowledge receipt of the instructions indicated above, and I understand that if any problems occur once I leave the hospital I am to contact my PCP.

## 2023-10-26 NOTE — CARE COORDINATION
Case Management Assessment  Initial Evaluation    Date/Time of Evaluation: 10/26/2023 8:20 AM  Assessment Completed by: Tisha Rizvi    If patient is discharged prior to next notation, then this note serves as note for discharge by case management. 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 (P) Yes    Chart Reviewed: Yes      History Provided by:    Patient Orientation: Alert and Oriented, Person, Place    Patient Cognition: Alert    Hospitalization in the last 30 days (Readmission):  Yes    If yes, Readmission Assessment in  Navigator will be completed.     Advance Directives:      Code Status: Full Code   Patient's Primary Decision Maker is: Legal Next of Kin      Discharge Planning:    Patient lives with: Alone Type of Home: (P) House  Primary Care Giver: Self  Patient Support Systems include: Children   Current Financial resources: (P) Medicare  Current community resources: (P) Other (Comment) (applied last week for transport with [employer])  Current services prior to admission: (P) Durable Medical Equipment            Current DME: (P) Walker, Cane, Shower Chair, Bedside Commode            Type of Home Care services:  (P) PT, Skilled Therapy, OT    ADLS  Prior functional level: (P) Independent in ADLs/IADLs  Current functional level: (P) Independent in ADLs/IADLs    PT AM-PAC: 19 /24  OT AM-PAC: 20 /24    Family can provide assistance at DC: (P) No (lives

## 2023-10-26 NOTE — DISCHARGE INSTR - COC
Score:  Readmission Risk              Risk of Unplanned Readmission:  11           Discharging to Facility/ Agency   Name: Alexander Zarate will call for Appointment  Phone: [phone]  Fax: [phone]     Dialysis Facility (if applicable)   Name:  Address:  Dialysis Schedule:  Phone:  Fax:    / signature: Electronically signed by Titi Mobley on 10/26/23 at 12:27 PM EDT    PHYSICIAN SECTION    Prognosis: Fair    Condition at Discharge: Stable    Rehab Potential (if transferring to Rehab): Fair    Recommended Labs or Other Treatments After Discharge:   Close monitoring of blood pressure and follow up with BID for medication adjustment. Repeat BMP with PCP in 1 week. Follow up with PCP for imaging of left kidney lesion noted incidentally on MRI T spine. Follow up at the [address] [place] [address] for suspected brain aneurysm. Physician Certification: I certify the above information and transfer of Elisabet Lopez  is necessary for the continuing treatment of the diagnosis listed and that she requires [address] [place] [address] for greater 30 days.      Update Admission H&P: No change in H&P    PHYSICIAN SIGNATURE:  Electronically signed by Benedict Gan MD on 10/26/23 at 12:52 PM EDT

## 2023-10-26 NOTE — PROGRESS NOTES
Discharge instructions reviewed with pt. Answered any questions or concerns regarding d/c. IV and Tele removed without complications. Pt transported to lobby via wheelchair in stable condition.
Dr Madhavi Ruelas  at bedside admission nurse will check back in to complete admission
Facility/Department: [address]  Speech Language Pathology   Dysphagia Treatment Note    Patient: Chance Oden   : 1940   MRN: [id number]      Evaluation Date: 10/25/2023      Admitting Dx: Abnormal EKG [R94.31]  Aneurysm of carotid artery (HCC) [I72.0]  Personal history of cerebral artery stenosis [Z86.79]  History of CVA (cerebrovascular accident) [Z86.73]  Bilateral leg weakness [R29.898]  Hypertensive emergency [I16.1]  Weakness of both lower extremities [R29.898]  Treatment Diagnosis: Oropharyngeal Dysphagia   Pain: Denies          MRI:        IMPRESSION:  No acute infarct or other acute intracranial process. Diet and Treatment Recommendations 10/25/2023:  Diet Solids Recommendation:  Regular texture diet  Liquid Consistency Recommendation: Thin liquids  Recommended form of Meds: Meds whole with water          Compensatory strategies:   Upright as possible with all PO intake , Small bites/sips , Remain upright 30-45 min     Assessment of Texture Tolerance:  Diet level prior to treatment: Regular texture diet , Thin liquids   Tolerance of Current Diet Level:RN reported pt appears to be tolerating current diet level     Impressions: Pt was positioned Upright in bed , awake and alert. Currently on room air. Trials of thin liquids and regular solids  were provided to assess swallow function. Adequate bolus control and A-P propulsion noted with both thin liquids and solid textures. Clinical symptoms of mild delayed swallow initiation and persistent audible swallow noted with all po trials. Pt and son report that audible swallow is consistent with her baseline history of esophageal dysphagia with no current changes reported. Adequate bolus transfer through pharynx is suspected with no overt evidence of reduced/delayed pharyngeal clearing noted.  Education regarding aspiration risk and precautions due to baseline esophageal dysphagia explained to the Pt who verbalized
Facility/Department: [address]  Speech Language Pathology  DYSPHAGIA BEDSIDE SWALLOW EVALUATION     Patient: Malka Georges   : 1940   MRN: [id number]      Evaluation Date: 10/24/2023   Admitting Diagnosis: Abnormal EKG [R94.31]  Aneurysm of carotid artery ([address]) [I72.0]  Personal history of cerebral artery stenosis [Z86.79]  History of CVA (cerebrovascular accident) [Z86.73]  Bilateral leg weakness [R29.898]  Hypertensive emergency [I16.1]  Weakness of both lower extremities [R29.898]  Pain: Denies                                                       H&P: Malka Georges is a 81 y.o. female who presents to the emergency department with weakness to bilateral legs. Patient states that she slid out of bed yesterday evening around 10 PM and noticed weakness to both her legs preventing her from walking. She states that she slept on the ground last night due to leg weakness and when she could not get up this morning she called the ambulance for lift assist.  She states that the same thing happened to her about 1 year ago when she had her last stroke. She states that both her legs were weak last time. She denies any residual deficits from her old stroke. She is not anticoagulated. She endorses compliance with her antihypertensive medications as below. She is not having any chest, back or abdominal pain. No head trauma or headache. 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 Head CT: IMPRESSION:  No acute intracranial abnormality. History/Prior Level of Function:   Living Status: Home  Prior Dysphagia History: No prior Speech Language or dysphagia history noted per chart review or per Pt report.  Currently the Pt is alert and oriented to current place and
Patient seen in ED, room 08. Admission completed with the following exceptions:  Home Medications left 'In Process' as pharmacy technician present and will be completing home medications. Patient reports her son who was earlier but went to get something to eat has a list if her medications. Floor RN will need to complete the 4 Eyes Assessment, Immunizations, Covid Vaccines, Rights and Responsibilities, Orientation to room, Plan of Care, Education/Learning Assessment and Education, white board, height and weight, pain assessment and head to toe assessment. Patient is alert and oriented X 4. Patient lives at home alone and is being admitted for Bilateral leg weakness. Plan of care updated if indicated. All questions answered.
Skylar Barriga  Neurology Follow-up  [facility] Neurology    Date of Service: 10/25/2023    Subjective:   CC: Follow up today regarding: Bilateral lower extremity weakness    Events noted. Chart and lab reviewed. Patient seen this morning, family at bedside. We discussed MRI brain results which showed no acute stroke. Patient has not worked with therapy today. Urinalysis was concerning for UTI. Patient was started on antibiotics. Creatinine today elevated, 1.3. Today she reports lower back pain. Patient states normally she does not use ambulatory aids unless walking long distances. She denies headache, dizziness, or worsening weakness in lower extremities. Other review systems unremarkable      ROS : A 10-12 system review obtained and updated today and is unremarkable except as mentioned  in my interval history. Past medical history, social history, medication and family history reviewed. Objective:  Exam:   Constitutional:   Vitals:    10/25/23 0730 10/25/23 0837 10/25/23 0911 10/25/23 0938   BP:   (!) 164/71    Pulse:    63   Resp:       Temp:       TempSrc:       SpO2: 96%      Weight:  86 kg (189 lb 9.5 oz)     Height:         General appearance:  Normal development and appear in no acute distress. Mental Status:   Oriented to person, place, problem, and time. Memory: Good immediate recall. Intact remote memory  Normal attention span and concentration. Language: intact naming, repeating and fluency   Good fund of Knowledge. Cranial Nerves:   II:   Pupils: equal, round, reactive to light  III,IV,VI: Extra Ocular Movements are intact. No nystagmus  V: Facial sensation is intact  VII: Facial strength and movements: intact and symmetric  XII: Tongue movements are normal  Musculoskeletal: 5/5 upper extremities. 4/5 right lower extremity, 3/5 left lower extremity. Tone: Normal tone.    Reflexes: Symmetric 2+ in both arms and diminished legs, hx of bilateral knee
numbness or tingling bladder or bowel issues. r       On examination:  No acute distress  Awake and alert x3. Fluent speech. Appears appropriate with intact recent and remote memory. Pupil reactive and symmetric, extraocular motor intact, no ophthalmoplegia, face is symmetric and tongue is midline  No focal weakness in upper extremity. 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 Since she is improving, need for further testing at this point  Follow-up outpatient with her spine physician regarding further management  PT and OT  Blood pressure control  Hydration  Antibiotics  Follow kidney function test  Follow-up me outpatient if symptoms not better to consider further work-up and EMG/NCV. Discussed with family      Electronically signed by Lisa Novak MD on 10/26/23 at 1:59 PM EDT                This dictation was generated by voice recognition computer software. Although all attempts are made to edit the dictation for accuracy, there may be errors in the transcription that are not intended.
increase [facility] ADL score = to or > than 23/24    Above goals reviewed on 10/25/2023. All goals are ongoing at this time unless indicated above.        Therapy Session Time     Individual Group Co-treatment   Time In    3666   Time Out    1434   Minutes    43        Timed Code Treatment Minutes:  Timed Code Treatment Minutes: 28 Minutes    Total Treatment Minutes:  37       Electronically Signed By: John Archer, [address] [facility], DARIAN/L, [id number] 10/25/2023 2:58 PM
with age-related atrophy. No extra-axial fluid collections, and no sign of recent intracranial hemorrhage. Decreased attenuation is noted within the periventricular white matter. Pattern is consistent with chronic small vessel ischemic change. No acute edema or mass effect. No mass lesions are detected. ORBITS: The visualized portion of the orbits demonstrate no acute abnormality. SINUSES: The visualized paranasal sinuses and mastoid air cells demonstrate no acute abnormality. SOFT TISSUES/SKULL:  No acute abnormality of the visualized skull or soft tissues. No acute intracranial abnormality. Findings were discussed with Todd Barker at 7:29 am on 10/24/2023. CBC:   Recent Labs     10/24/23  0720 10/25/23  0533   WBC 10.3 6.6   HGB 12.1 11.5*    253     BMP:    Recent Labs     10/24/23  0720 10/25/23  0533    136   K 3.7 3.5   CL 99 100   CO2 18* 21   BUN 27* 26*   CREATININE 1.0 1.3*   GLUCOSE 96 117*     Hepatic:   Recent Labs     10/24/23  0720 10/25/23  0533   AST 39* 29   ALT 42* 28   BILITOT 0.6 0.9   ALKPHOS 110 88     Lipids:   Lab Results   Component Value Date/Time    CHOL 179 10/25/2023 05:33 AM    HDL 66 10/25/2023 05:33 AM    TRIG 52 10/25/2023 05:33 AM     Hemoglobin A1C:   Lab Results   Component Value Date/Time    LABA1C 5.4 10/25/2023 05:33 AM     TSH: No results found for: \"TSH\"  Troponin: No results found for: \"TROPONINT\"  Lactic Acid: No results for input(s): \"LACTA\" in the last 72 hours. BNP: No results for input(s): \"PROBNP\" in the last 72 hours.   UA:  Lab Results   Component Value Date/Time    NITRU POSITIVE 10/24/2023 09:06 AM    COLORU Yellow 10/24/2023 09:06 AM    PHUR 5.5 10/24/2023 09:06 AM    WBCUA 1 10/24/2023 09:06 AM    RBCUA 0 10/24/2023 09:06 AM    BACTERIA 4+ 10/24/2023 09:06 AM    CLARITYU Clear 10/24/2023 09:06 AM    SPECGRAV 1.015 10/24/2023 09:06 AM    LEUKOCYTESUR TRACE 10/24/2023 09:06 AM    UROBILINOGEN 0.2 10/24/2023 09:06 AM    BILIRUBINUR Negative

## 2023-10-26 NOTE — THERAPY EVALUATION
arrival  Posture:   Rounded shoulders     Subjective  General: Sitting in chair, agreeable to therapy  Pain: 0/10  Pain Interventions: not applicable       Functional Mobility  Bed Mobility:  Bed mobility not completed on this date. Comments: Up in chair pre/post session  Transfers:  Sit to stand transfer: stand by assistance  Stand to sit transfer: stand by assistance  Toilet transfer: stand by assistance with use of grab bar  Comments: transfers completed without assistive device  Ambulation:  Surface:level surface  Assistive Device: no device  Assistance: stand by assistance  Distance: 200' x 1  Gait Mechanics: mildly guarded, decreased UE swing, decreased head turns, mild forward head, no LOB  Comments:    Stair Mobility:  Number of Steps: 12  Step Height: 6 inch  Hand Rails: (R) ascending handrail  Assistance: stand by assistance  Comments: reciprocal pattern, no losses of balance   Wheelchair Mobility:  No w/c mobility completed on this date. Comments:  Balance:  Static Sitting Balance: fair (+): maintains balance at SBA/supervision without use of UE support  Dynamic Sitting Balance: fair (+): maintains balance at SBA/supervision without use of UE support  Static Standing Balance: fair (+): maintains balance at SBA/supervision without use of UE support  Dynamic Standing Balance: fair: maintains balance at CGA without use of UE support  Comments: SBA this date without use of FWW     Other Therapeutic Interventions  Pt.  Completed seated LE exercises:  marching x 10, LAQ x 10, AP x 10 and abd/add x 10  Functional Outcomes  AM-PAC Inpatient Mobility Raw Score : 21              Cognition  WFL  Orientation:    alert and oriented x 4  Command Following:   [facility]    Education  Barriers To Learning: none  Patient Education: patient educated on goals, PT role and benefits, plan of care, general safety, functional mobility training, energy conservation, transfer training, discharge recommendations  Learning Assessment:
Frame: to be met by d/c  Patient will complete bed mobility at modified independent   Patient will complete stand step transfers at [facility]   Patient will ambulate 200 ft with use of LRAD at [facility]  Patient will ascend/descend 12 stairs with (B) handrail at modified independent  Patient will complete formal balance assessment    Above goals reviewed on 10/25/2023. All goals are ongoing at this time unless indicated above.       Therapy Session Time      Individual Group Co-treatment   Time In     1351   Time Out     1434   Minutes     43     Timed Code Treatment Minutes:   28 minutes  Total Treatment Minutes:  43 minutes       Electronically Signed By: Jorge Wheeler, [address], DPT [id number]

## 2023-10-26 NOTE — CARE COORDINATION
Home Care Information:   Is patient resuming current home health care services: {NO/YES:[id number]}    Home Care Agency:     [facility])  [address] [address]  Phone: [phone]  Fax: [phone]                          Services: PT/OT Nursing   Home Health Order Obtained: ***    Home health agency notified of discharge.

## 2023-10-26 NOTE — CARE COORDINATION
10/26/23 1227   IMM Letter   IMM Letter given to Patient/Family/Significant other/Guardian/POA/by: CM provided IMM at bedside to PT   IMM Letter date given: 10/26/23   IMM Letter time given: 1228     Electronically signed by Peterson Councilman on 10/26/2023 at 12:28 PM

## 2023-10-27 NOTE — CARE COORDINATION
[address] home care referral. Spoke with pt and re: home care plan of care/services. Agreeable. Demographic's verified. Aware of discharge with home care. Home care orders sent to [facility]. Discharge planner notified.